# Patient Record
(demographics unavailable — no encounter records)

---

## 2025-01-03 NOTE — ASSESSMENT
[FreeTextEntry1] : This is a 72-year-old male whose history has been reviewed above  He has a history of hypertension his blood pressure is under excellent control.  However he continues to take his blood pressure at home and it is elevated.  At this point I am considering him masked hypertension.  However I will ask him to get another cuff until we make any adjudication  He has a history of hypercholesterolemia remains on atorvastatin a cholesterol profile has been obtained medication changes predicated on the results  He does have history of elevated amylase and lipase we we will repeat these levels as well  I will continue to hold amlodipine and reevaluate in 3 months

## 2025-01-03 NOTE — PHYSICAL EXAM
[Normal] : no joint swelling and grossly normal strength and tone [No Focal Deficits] : no focal deficits

## 2025-01-03 NOTE — HISTORY OF PRESENT ILLNESS
[FreeTextEntry1] : This is a 72-year-old male for evaluation and treatment of his hypertension hypercholesterolemia elevated amylase cough [de-identified] : Patient has no cardiovascular or new GI complaints.  In terms of his nightmares he thinks that they may be somewhat less or less intense off amlodipine

## 2025-04-09 NOTE — HEALTH RISK ASSESSMENT
[Good] : ~his/her~ current health as good [Excellent] : ~his/her~  mood as  excellent [Yes] : In the past 12 months have you used drugs other than those required for medical reasons? Yes [No falls in past year] : Patient reported no falls in the past year [0] : 2) Feeling down, depressed, or hopeless: Not at all (0) [PHQ-2 Negative - No further assessment needed] : PHQ-2 Negative - No further assessment needed [Former] : Former [> 15 Years] : > 15 Years [NO] : No [With Significant Other] : lives with significant other [Employed] : employed [College] : College [] :  [Feels Safe at Home] : Feels safe at home [Fully functional (bathing, dressing, toileting, transferring, walking, feeding)] : Fully functional (bathing, dressing, toileting, transferring, walking, feeding) [Fully functional (using the telephone, shopping, preparing meals, housekeeping, doing laundry, using] : Fully functional and needs no help or supervision to perform IADLs (using the telephone, shopping, preparing meals, housekeeping, doing laundry, using transportation, managing medications and managing finances) [Smoke Detector] : smoke detector [Carbon Monoxide Detector] : carbon monoxide detector [Seat Belt] :  uses seat belt [Sunscreen] : uses sunscreen [[id number]] : 0 [Change in mental status noted] : No change in mental status noted [Language] : denies difficulty with language [Behavior] : denies difficulty with behavior [Learning/Retaining New Information] : denies difficulty learning/retaining new information [Handling Complex Tasks] : denies difficulty handling complex tasks [Spatial Ability and Orientation] : denies difficulty with spatial ability and orientation [Sexually Active] : not sexually active [Reports changes in hearing] : Reports no changes in hearing [Reports changes in vision] : Reports no changes in vision [Reports changes in dental health] : Reports no changes in dental health [Safety elements used in home] : no safety elements used in home [TB Exposure] : is not being exposed to tuberculosis [Caregiver Concerns] : does not have caregiver concerns

## 2025-04-09 NOTE — HISTORY OF PRESENT ILLNESS
[FreeTextEntry1] : This is a 72-year-old male for annual health assessment.  Specifically we will address his history of hypercholesterolemia hypertension hyper amylase anemia atypical chest pain he has had dyspnea on exertion and he probably has mast hypertension [de-identified] : Patient has some new complaints today he is having pain in his right leg which is attributed to hip pain.  He apparently had x-rays which showed osteoarthritis.  In addition he has had difficulties with his PSA we did a PSA today he does follow with urology.  In addition he has noted a small mass on his left chest

## 2025-07-16 NOTE — PHYSICAL EXAM
[No JVD] : no jugular venous distention [No Edema] : there was no peripheral edema [Normal] : soft, non-tender, non-distended, no masses palpated, no HSM and normal bowel sounds

## 2025-07-16 NOTE — ASSESSMENT
[FreeTextEntry1] : elevated lipase and amylase  This is a 73-year-old male whose history has been reviewed above  He has a history of hypertension his blood pressure is under excellent control he has no orthostasis no medication changes.  In addition we took his blood pressure several times synchronously with his blood pressure cuff his blood pressure cuff was consistently 25 mm above mine.  I have asked him to discard the cuff and obtain a new one  He has a history of hypercholesterolemia remains on atorvastatin a cholesterol profile has been obtained medication changes predicated on the results  He has a history of elevated lipase and amylase this was repeated  Because of his dreams we will stop the amlodipine he will call me in a couple of weeks.  At that point we will have to make a decision as to reinstating of this low dose of medication  He will follow-up with his PSA with urology this will be his second visit

## 2025-07-16 NOTE — HISTORY OF PRESENT ILLNESS
[FreeTextEntry1] : This is a 73-year-old male for evaluation and treatment of his hypertension hypercholesterolemia hyper amylase EMEA atypical chest pain [de-identified] : Patient has 2 new issues.  He does have his blood pressure cuff with him as his blood pressures at home have consistently be elevated.  Also he has been having bad dreams since he started amlodipine